# Patient Record
Sex: FEMALE | Race: WHITE | Employment: OTHER | URBAN - NONMETROPOLITAN AREA
[De-identification: names, ages, dates, MRNs, and addresses within clinical notes are randomized per-mention and may not be internally consistent; named-entity substitution may affect disease eponyms.]

---

## 2023-01-20 ENCOUNTER — APPOINTMENT (OUTPATIENT)
Dept: GENERAL RADIOLOGY | Age: 73
End: 2023-01-20
Payer: MEDICARE

## 2023-01-20 ENCOUNTER — APPOINTMENT (OUTPATIENT)
Dept: CT IMAGING | Age: 73
End: 2023-01-20
Payer: MEDICARE

## 2023-01-20 ENCOUNTER — HOSPITAL ENCOUNTER (EMERGENCY)
Age: 73
Discharge: HOME | End: 2023-01-21
Attending: STUDENT IN AN ORGANIZED HEALTH CARE EDUCATION/TRAINING PROGRAM
Payer: MEDICARE
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
## 2023-01-20 DIAGNOSIS — S09.90XA INJURY OF HEAD, INITIAL ENCOUNTER: Primary | ICD-10-CM

## 2023-01-20 LAB
ABSOLUTE EOS #: 0.2 K/UL (ref 0–0.44)
ABSOLUTE IMMATURE GRANULOCYTE: <0.03 K/UL (ref 0–0.3)
ABSOLUTE LYMPH #: 2.29 K/UL (ref 1.1–3.7)
ABSOLUTE MONO #: 0.51 K/UL (ref 0.1–1.2)
ALBUMIN SERPL-MCNC: 4.2 G/DL (ref 3.5–5.2)
ALBUMIN/GLOBULIN RATIO: 1.4 (ref 1–2.5)
ALP BLD-CCNC: 59 U/L (ref 35–104)
ALT SERPL-CCNC: 11 U/L (ref 5–33)
ANION GAP SERPL CALCULATED.3IONS-SCNC: 11 MMOL/L (ref 9–17)
AST SERPL-CCNC: 11 U/L
BASOPHILS # BLD: 0 % (ref 0–2)
BASOPHILS ABSOLUTE: <0.03 K/UL (ref 0–0.2)
BILIRUB SERPL-MCNC: 0.2 MG/DL (ref 0.3–1.2)
BUN BLDV-MCNC: 24 MG/DL (ref 8–23)
BUN/CREAT BLD: 16 (ref 9–20)
CALCIUM SERPL-MCNC: 9.8 MG/DL (ref 8.6–10.4)
CHLORIDE BLD-SCNC: 101 MMOL/L (ref 98–107)
CO2: 23 MMOL/L (ref 20–31)
CREAT SERPL-MCNC: 1.49 MG/DL (ref 0.5–0.9)
D-DIMER QUANTITATIVE: <0.27 MG/L FEU (ref 0–0.59)
EOSINOPHILS RELATIVE PERCENT: 3 % (ref 1–4)
GFR SERPL CREATININE-BSD FRML MDRD: 38 ML/MIN/1.73M2
GLUCOSE BLD-MCNC: 166 MG/DL (ref 70–99)
HCT VFR BLD CALC: 37.6 % (ref 36.3–47.1)
HEMOGLOBIN: 12.5 G/DL (ref 11.9–15.1)
IMMATURE GRANULOCYTES: 0 %
INR BLD: 1.1
LYMPHOCYTES # BLD: 34 % (ref 24–43)
MCH RBC QN AUTO: 30.7 PG (ref 25.2–33.5)
MCHC RBC AUTO-ENTMCNC: 33.2 G/DL (ref 28.4–34.8)
MCV RBC AUTO: 92.4 FL (ref 82.6–102.9)
MONOCYTES # BLD: 8 % (ref 3–12)
NRBC AUTOMATED: 0 PER 100 WBC
PDW BLD-RTO: 13.1 % (ref 11.8–14.4)
PLATELET # BLD: 229 K/UL (ref 138–453)
PMV BLD AUTO: 8.7 FL (ref 8.1–13.5)
POTASSIUM SERPL-SCNC: 3.8 MMOL/L (ref 3.7–5.3)
PROTHROMBIN TIME: 13.8 SEC (ref 11.5–14.2)
RBC # BLD: 4.07 M/UL (ref 3.95–5.11)
SEG NEUTROPHILS: 55 % (ref 36–65)
SEGMENTED NEUTROPHILS ABSOLUTE COUNT: 3.65 K/UL (ref 1.5–8.1)
SODIUM BLD-SCNC: 135 MMOL/L (ref 135–144)
TOTAL PROTEIN: 7.2 G/DL (ref 6.4–8.3)
WBC # BLD: 6.7 K/UL (ref 3.5–11.3)

## 2023-01-20 PROCEDURE — 80053 COMPREHEN METABOLIC PANEL: CPT

## 2023-01-20 PROCEDURE — 85025 COMPLETE CBC W/AUTO DIFF WBC: CPT

## 2023-01-20 PROCEDURE — 96374 THER/PROPH/DIAG INJ IV PUSH: CPT

## 2023-01-20 PROCEDURE — 36415 COLL VENOUS BLD VENIPUNCTURE: CPT

## 2023-01-20 PROCEDURE — 72170 X-RAY EXAM OF PELVIS: CPT

## 2023-01-20 PROCEDURE — 85610 PROTHROMBIN TIME: CPT

## 2023-01-20 PROCEDURE — 70450 CT HEAD/BRAIN W/O DYE: CPT

## 2023-01-20 PROCEDURE — 85379 FIBRIN DEGRADATION QUANT: CPT

## 2023-01-20 PROCEDURE — 96361 HYDRATE IV INFUSION ADD-ON: CPT

## 2023-01-20 PROCEDURE — 72125 CT NECK SPINE W/O DYE: CPT

## 2023-01-20 PROCEDURE — 71045 X-RAY EXAM CHEST 1 VIEW: CPT

## 2023-01-20 PROCEDURE — 99284 EMERGENCY DEPT VISIT MOD MDM: CPT

## 2023-01-20 ASSESSMENT — LIFESTYLE VARIABLES
HOW OFTEN DO YOU HAVE A DRINK CONTAINING ALCOHOL: NEVER
HOW MANY STANDARD DRINKS CONTAINING ALCOHOL DO YOU HAVE ON A TYPICAL DAY: PATIENT DOES NOT DRINK

## 2023-01-21 ENCOUNTER — APPOINTMENT (OUTPATIENT)
Dept: GENERAL RADIOLOGY | Age: 73
End: 2023-01-21
Payer: MEDICARE

## 2023-01-21 VITALS
DIASTOLIC BLOOD PRESSURE: 56 MMHG | OXYGEN SATURATION: 96 % | TEMPERATURE: 98.2 F | RESPIRATION RATE: 21 BRPM | BODY MASS INDEX: 34.99 KG/M2 | HEIGHT: 65 IN | WEIGHT: 210 LBS | SYSTOLIC BLOOD PRESSURE: 145 MMHG | HEART RATE: 77 BPM

## 2023-01-21 LAB
BACTERIA: ABNORMAL
BILIRUBIN URINE: NEGATIVE
COLOR: YELLOW
EPITHELIAL CELLS UA: ABNORMAL /HPF (ref 0–25)
GLUCOSE URINE: NEGATIVE
KETONES, URINE: NEGATIVE
LEUKOCYTE ESTERASE, URINE: ABNORMAL
NITRITE, URINE: NEGATIVE
PH UA: 6 (ref 5–9)
PROTEIN UA: NEGATIVE
RBC UA: ABNORMAL /HPF (ref 0–2)
SPECIFIC GRAVITY UA: >1.03 (ref 1.01–1.02)
TURBIDITY: CLEAR
URINE HGB: NEGATIVE
UROBILINOGEN, URINE: NORMAL
WBC UA: ABNORMAL /HPF (ref 0–5)

## 2023-01-21 PROCEDURE — 73110 X-RAY EXAM OF WRIST: CPT

## 2023-01-21 PROCEDURE — 6360000002 HC RX W HCPCS: Performed by: STUDENT IN AN ORGANIZED HEALTH CARE EDUCATION/TRAINING PROGRAM

## 2023-01-21 PROCEDURE — 73130 X-RAY EXAM OF HAND: CPT

## 2023-01-21 PROCEDURE — 73590 X-RAY EXAM OF LOWER LEG: CPT

## 2023-01-21 PROCEDURE — 73562 X-RAY EXAM OF KNEE 3: CPT

## 2023-01-21 PROCEDURE — 2580000003 HC RX 258: Performed by: STUDENT IN AN ORGANIZED HEALTH CARE EDUCATION/TRAINING PROGRAM

## 2023-01-21 PROCEDURE — 81001 URINALYSIS AUTO W/SCOPE: CPT

## 2023-01-21 RX ORDER — SPIRONOLACTONE 25 MG/1
25 TABLET ORAL DAILY
COMMUNITY

## 2023-01-21 RX ORDER — BUPROPION HYDROCHLORIDE 300 MG/1
300 TABLET ORAL EVERY MORNING
COMMUNITY

## 2023-01-21 RX ORDER — SERTRALINE HYDROCHLORIDE 100 MG/1
100 TABLET, FILM COATED ORAL DAILY
COMMUNITY

## 2023-01-21 RX ORDER — 0.9 % SODIUM CHLORIDE 0.9 %
1000 INTRAVENOUS SOLUTION INTRAVENOUS ONCE
Status: COMPLETED | OUTPATIENT
Start: 2023-01-21 | End: 2023-01-21

## 2023-01-21 RX ORDER — CYCLOSPORINE 0.5 MG/ML
1 EMULSION OPHTHALMIC DAILY PRN
COMMUNITY

## 2023-01-21 RX ORDER — KETOROLAC TROMETHAMINE 15 MG/ML
15 INJECTION, SOLUTION INTRAMUSCULAR; INTRAVENOUS ONCE
Status: COMPLETED | OUTPATIENT
Start: 2023-01-21 | End: 2023-01-21

## 2023-01-21 RX ORDER — LOSARTAN POTASSIUM 100 MG/1
100 TABLET ORAL DAILY
COMMUNITY

## 2023-01-21 RX ORDER — ACETAMINOPHEN 160 MG
1 TABLET,DISINTEGRATING ORAL DAILY
COMMUNITY

## 2023-01-21 RX ORDER — ATORVASTATIN CALCIUM 20 MG/1
20 TABLET, FILM COATED ORAL DAILY
COMMUNITY

## 2023-01-21 RX ADMIN — SODIUM CHLORIDE 1000 ML: 9 INJECTION, SOLUTION INTRAVENOUS at 03:44

## 2023-01-21 RX ADMIN — KETOROLAC TROMETHAMINE 15 MG: 15 INJECTION, SOLUTION INTRAMUSCULAR; INTRAVENOUS at 03:45

## 2023-01-21 ASSESSMENT — ENCOUNTER SYMPTOMS
RHINORRHEA: 0
EYE PAIN: 0
SHORTNESS OF BREATH: 0
ABDOMINAL PAIN: 0
VOMITING: 0
NAUSEA: 0

## 2023-01-21 NOTE — ED PROVIDER NOTES
[address] [facility]      Pt Name: Salma Arrington  MRN: [id number]  [place] 1950  Date of evaluation: 1/20/2023  Provider: Blanca Newman MD     CHIEF COMPLAINT       Chief Complaint   Patient presents with    [address] and was eating at dining table and stood up and tripped over cane and fell and hit head and face on tile floor. Patient never lost consciousness. Patient is on blood thinners. HISTORY OF PRESENT ILLNESS   (Location/Symptom, Timing/Onset, Context/Setting, Quality, Duration, Modifying Factors, Severity) Note limiting factors. I wore a surgical mask for the entirety of this encounter. HPI    Salma Arrington is a 81 y.o. female with medical history significant for depression, high diabetes, and hypertension who presents to the emergency department for evaluation for a fall. Patient was at a hotel when she tripped to have a cane and fell. She states she fell onto the floor face first.  She states she never lost consciousness during the incident that she endorses hitting her head. Patient states she is on blood thinners at home. She also endorses pain in her hands bilaterally. She denies neck pain or back pain. She also denies chest pain, shortness of breath or hip pain. She endorses pain in her knees with ambulation but she states she is able to ambulate. Patient denies nausea or vomiting. She still denies any focal neurologic deficits. Patient states has been following falling quite frequently at home. States this secondary to unsteady gait at home. Nursing Notes were reviewed. REVIEW OF SYSTEMS    (2+ for level 4; 10+ for level 5)   Review of Systems   Constitutional:  Negative for activity change, diaphoresis, fever and unexpected weight change. HENT:  Negative for congestion and rhinorrhea. Eyes:  Negative for pain and visual disturbance. Respiratory:  Negative for shortness of breath.     Cardiovascular:  Negative for chest pain and palpitations. Gastrointestinal:  Negative for abdominal pain, nausea and vomiting. Genitourinary:  Negative for decreased urine volume and hematuria. Musculoskeletal:  Negative for arthralgias and myalgias. Skin:  Negative for wound. Neurological:  Negative for weakness and light-headedness. Psychiatric/Behavioral:  Negative for confusion.       PAST MEDICAL HISTORY     Past Medical History:   Diagnosis Date    Depression     Diabetes mellitus ([facility] [address]     Hypertension        SURGICAL HISTORY       Past Surgical History:   Procedure Laterality Date    CARDIOVERSION      CHOLECYSTECTOMY      EYE SURGERY      HYSTERECTOMY (CERVIX STATUS UNKNOWN)         CURRENT MEDICATIONS       Previous Medications    ASPIRIN 81 MG CAPS    Take 1 tablet by mouth daily    ATORVASTATIN (LIPITOR) 20 MG TABLET    Take 20 mg by mouth daily    BUPROPION (WELLBUTRIN XL) 300 MG EXTENDED RELEASE TABLET    Take 300 mg by mouth every morning    CHOLECALCIFEROL (VITAMIN D3) 50 MCG (2000 UT) CAPS    Take 1 capsule by mouth daily    CYCLOSPORINE (RESTASIS) 0.05 % OPHTHALMIC EMULSION    Place 1 drop into both eyes daily as needed    LOSARTAN (COZAAR) 100 MG TABLET    Take 100 mg by mouth daily    METFORMIN (GLUCOPHAGE) 500 MG TABLET    Take 500 mg by mouth 2 times daily (with meals) At 2am &2pm    MULTIPLE VITAMIN (MULTIVITAMIN ADULT PO)    Take 1 tablet by mouth daily    MULTIPLE VITAMINS-MINERALS (OCUVITE ADULT 50+ PO)    Take 1 tablet by mouth daily    SERTRALINE (ZOLOFT) 100 MG TABLET    Take 100 mg by mouth daily    SPIRONOLACTONE (ALDACTONE) 25 MG TABLET    Take 25 mg by mouth daily       ALLERGIES     Adhesive tape and Pcn [penicillins]    FAMILY HISTORY       Family History   Problem Relation Age of Onset    Depression Mother     Heart Disease Father         SOCIAL HISTORY       Social History     Socioeconomic History    Marital status:      Spouse name: None    Number of children: None    Years of education: None Highest education level: None   Tobacco Use    Smoking status: Never    Smokeless tobacco: Never   Vaping Use    Vaping Use: Never used   Substance and Sexual Activity    Alcohol use: Not Currently    Drug use: Not Currently    Sexual activity: Not Currently       SCREENINGS    Vicky Coma Scale  Eye Opening: Spontaneous  Best Verbal Response: Oriented  Best Motor Response: Obeys commands  [place] Coma Scale Score: 15      PHYSICAL EXAM    (up to 7 for level 4, 8 or more for level 5)     ED Triage Vitals [01/20/23 2255]   BP Temp Temp Source Heart Rate Resp SpO2 Height Weight   (!) 147/62 98.2 °F (36.8 °C) Oral 81 18 100 % 5' 5\" (1.651 m) 210 lb (95.3 kg)       Physical Exam  Vitals and nursing note reviewed. Constitutional:       General: She is not in acute distress. Appearance: She is not ill-appearing or toxic-appearing. HENT:      Head: Normocephalic. Comments: Patient with a cephalohematoma in the front of her head with an abrasion on her nose and her upper lip. No loose teeth appreciated. No tenderness over the jaw. Midface stable. Right Ear: Tympanic membrane and external ear normal.      Left Ear: Tympanic membrane and external ear normal.      Nose: No congestion or rhinorrhea. Mouth/Throat:      Mouth: Mucous membranes are moist.      Pharynx: No oropharyngeal exudate. Eyes:      General: No scleral icterus. Right eye: No discharge. Left eye: No discharge. Conjunctiva/sclera: Conjunctivae normal.   Cardiovascular:      Rate and Rhythm: Normal rate and regular rhythm. Pulses: Normal pulses. Heart sounds: No murmur heard. No gallop. Pulmonary:      Effort: Pulmonary effort is normal. No respiratory distress. Breath sounds: Normal breath sounds. No stridor. No wheezing or rhonchi. Abdominal:      General: Abdomen is flat. There is no distension. Palpations: Abdomen is soft. Tenderness: There is no abdominal tenderness. Musculoskeletal:         General: No swelling, tenderness or deformity. Cervical back: Normal range of motion and neck supple. Skin:     General: Skin is warm. Capillary Refill: Capillary refill takes less than 2 seconds. Coloration: Skin is not jaundiced. Findings: No bruising or rash. Neurological:      General: No focal deficit present. Mental Status: She is alert and oriented to person, place, and time. Cranial Nerves: No cranial nerve deficit. Sensory: No sensory deficit. Motor: No weakness. Psychiatric:         Mood and Affect: Mood normal.         Behavior: Behavior normal.         Thought Content: Thought content normal.       DIAGNOSTIC RESULTS     Interpretation per the Radiologist below, if available at the time of this note:  XR PELVIS (1-2 VIEWS)    Result Date: 1/21/2023  EXAMINATION: ONE XRAY VIEW OF THE PELVIS 1/20/2023 11:43 pm COMPARISON: None. HISTORY: ORDERING SYSTEM PROVIDED HISTORY: fall TECHNOLOGIST PROVIDED HISTORY: fall FINDINGS: Pelvic ring intact. Fixation and laminectomy lumbar spine. Hips are in anatomic alignment. Soft tissues unremarkable. No fracture noted     XR WRIST LEFT (MIN 3 VIEWS)    Result Date: 1/21/2023  EXAMINATION: 3  XRAY VIEWS OF THE LEFT WRIST 1/21/2023 2:13 am COMPARISON: None. HISTORY: ORDERING SYSTEM PROVIDED HISTORY: [address] TECHNOLOGIST PROVIDED HISTORY: [address] FINDINGS: Degenerative changes 1st CMC joint. Distal radius and ulna are intact. No acute fracture or dislocation. Soft tissues are unremarkable. No acute fracture or dislocation of the left wrist.     XR WRIST RIGHT (MIN 3 VIEWS)    Result Date: 1/21/2023  EXAMINATION: 3 XRAY VIEWS OF THE RIGHT WRIST 1/21/2023 2:13 am COMPARISON: None. HISTORY: ORDERING SYSTEM PROVIDED HISTORY: [address] TECHNOLOGIST PROVIDED HISTORY: [address] FINDINGS: No acute fracture or dislocation. Soft tissues are unremarkable.      No acute findings in the right wrist.     XR HAND LEFT (MIN 3 VIEWS)    Result Date: 1/21/2023  EXAMINATION: THREE XRAY VIEWS OF THE LEFT HAND 1/21/2023 2:12 am COMPARISON: None. HISTORY: ORDERING SYSTEM PROVIDED HISTORY: [address] TECHNOLOGIST PROVIDED HISTORY: [address] FINDINGS: No acute fracture or dislocation. Moderate degenerative changes of the DIP joints throughout the hand, as well as 1st CMC joint. Soft tissues are unremarkable. No acute findings in the left hand. XR HAND RIGHT (MIN 3 VIEWS)    Result Date: 1/21/2023  EXAMINATION: THREE XRAY VIEWS OF THE RIGHT HAND 1/21/2023 2:13 am COMPARISON: None. HISTORY: ORDERING SYSTEM PROVIDED HISTORY: [address] TECHNOLOGIST PROVIDED HISTORY: [address] FINDINGS: No acute fracture or dislocation. Mild to moderate degenerative changes 1st CMC joint, PIP and DIP joints. Soft tissues are unremarkable. No acute findings in the right hand. XR KNEE LEFT (3 VIEWS)    Result Date: 1/21/2023  EXAMINATION: THREE XRAY VIEWS OF THE LEFT KNEE 1/21/2023 2:14 am COMPARISON: None. HISTORY: ORDERING SYSTEM PROVIDED HISTORY: fall with knee pain TECHNOLOGIST PROVIDED HISTORY: fall with knee pain FINDINGS: No acute osseous abnormality is seen. Moderate degenerative changes of the medial femoral tibial compartment, patellofemoral compartment. Mild degenerative changes lateral femorotibial compartment. There is no significant joint effusion. The soft tissues appear unremarkable. No acute abnormality identified of the left knee. XR KNEE RIGHT (3 VIEWS)    Result Date: 1/21/2023  EXAMINATION: THREE XRAY VIEWS OF THE RIGHT KNEE 1/21/2023 2:14 am COMPARISON: None. HISTORY: ORDERING SYSTEM PROVIDED HISTORY: fall with knee pain TECHNOLOGIST PROVIDED HISTORY: fall with knee pain FINDINGS: No acute fracture or dislocation. Moderate to severe degenerative changes including joint space narrowing of the medial femorotibial compartment and patellofemoral compartment. There is no significant joint effusion.   The soft tissues appear unremarkable. No acute abnormality identified of the right knee. XR TIBIA FIBULA LEFT (2 VIEWS)    Result Date: 1/21/2023  EXAMINATION: 2  XRAY VIEWS OF THE LEFT TIBIA AND FIBULA 1/21/2023 2:15 am COMPARISON: None. HISTORY: ORDERING SYSTEM PROVIDED HISTORY: fall TECHNOLOGIST PROVIDED HISTORY: fall FINDINGS: No acute fracture or dislocation. Soft tissues are unremarkable. No acute fracture or dislocation of the left tib fib. XR TIBIA FIBULA RIGHT (2 VIEWS)    Result Date: 1/21/2023  EXAMINATION: 2 XRAY VIEWS OF THE RIGHT TIBIA AND FIBULA 1/21/2023 2:14 am COMPARISON: None. HISTORY: ORDERING SYSTEM PROVIDED HISTORY: fall TECHNOLOGIST PROVIDED HISTORY: fall FINDINGS: No acute fracture or dislocation. Soft tissues are unremarkable. No acute fracture or dislocation of the right tib fib. CT Head W/O Contrast    Result Date: 1/21/2023  EXAMINATION: CT OF THE HEAD WITHOUT CONTRAST  1/20/2023 11:43 pm TECHNIQUE: CT of the head was performed without the administration of intravenous contrast. Automated exposure control, iterative reconstruction, and/or weight based adjustment of the mA/kV was utilized to reduce the radiation dose to as low as reasonably achievable. COMPARISON: None. HISTORY: ORDERING SYSTEM PROVIDED HISTORY: fall with head trauma on anticoagulation TECHNOLOGIST PROVIDED HISTORY: fall with head trauma on anticoagulation Decision Support Exception - unselect if not a suspected or confirmed emergency medical condition->Emergency Medical Condition (MA) FINDINGS: BRAIN/VENTRICLES: There is no acute intracranial hemorrhage, mass effect or midline shift. No abnormal extra-axial fluid collection. The gray-white differentiation is maintained without evidence of an acute infarct. There is prominence of the ventricles and sulci due to global parenchymal volume loss.  There are nonspecific areas of hypoattenuation within the periventricular and subcortical white matter, which likely represent chronic microvascular ischemic change. ORBITS: The visualized portion of the orbits demonstrate no acute abnormality. SINUSES: The visualized paranasal sinuses and mastoid air cells demonstrate no acute abnormality. SOFT TISSUES/SKULL: Hematoma left forehead. No acute intracranial abnormality. Hematoma left forehead. CT CSpine W/O Contrast    Result Date: 1/21/2023  EXAMINATION: CT OF THE CERVICAL SPINE WITHOUT CONTRAST 1/20/2023 11:45 pm TECHNIQUE: CT of the cervical spine was performed without the administration of intravenous contrast. Multiplanar reformatted images are provided for review. Automated exposure control, iterative reconstruction, and/or weight based adjustment of the mA/kV was utilized to reduce the radiation dose to as low as reasonably achievable. COMPARISON: None. HISTORY: ORDERING SYSTEM PROVIDED HISTORY: fall with head trauma TECHNOLOGIST PROVIDED HISTORY: fall with head trauma Decision Support Exception - unselect if not a suspected or confirmed emergency medical condition->Emergency Medical Condition (MA) FINDINGS: BONES/ALIGNMENT: There is no acute fracture or traumatic malalignment. 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 Scattered lucencies in the cervical spine are favored to be due to low bone density or degenerative changes, for instance C5 (601:45). DEGENERATIVE CHANGES: Multilevel degenerative changes resulting in mild-to-moderate spinal canal narrowing at C5-C6. SOFT TISSUES: There is no prevertebral soft tissue swelling. No acute abnormality of the cervical spine. Multilevel degenerative changes resulting in mild-to-moderate spinal canal narrowing at C5-C6. Scattered lucencies in the cervical spine are favored to be due to low bone density or degenerative changes. A non urgent outpatient cervical spine MRI with contrast can be considered for further characterization.      XR CHEST PORTABLE    Result Date: 1/21/2023  EXAMINATION: ONE XRAY VIEW OF THE CHEST 1/20/2023 11:43 pm COMPARISON: None. HISTORY: ORDERING SYSTEM PROVIDED HISTORY: fall FINDINGS: Heart size is normal. No focal consolidation in the lungs. No pleural effusion or pneumothorax. No acute abnormality. ED BEDSIDE ULTRASOUND:   Performed by ED Physician - none    LABS:  Labs Reviewed   COMPREHENSIVE METABOLIC PANEL - Abnormal; Notable for the following components:       Result Value    Glucose 166 (*)     BUN 24 (*)     Creatinine 1.49 (*)     Est, Glom Filt Rate 38 (*)     Total Bilirubin 0.2 (*)     All other components within normal limits   URINALYSIS - Abnormal; Notable for the following components:    Specific Gravity, UA >1.030 (*)     Leukocyte Esterase, Urine MODERATE (*)     All other components within normal limits   MICROSCOPIC URINALYSIS - Abnormal; Notable for the following components:    Bacteria, UA TRACE (*)     All other components within normal limits   D-DIMER, QUANTITATIVE   PROTIME-INR   CBC WITH AUTO DIFFERENTIAL        All other labs were within normal range or not returned as of this dictation. EMERGENCY DEPARTMENT COURSE and DIFFERENTIAL DIAGNOSIS/MDM:   Vitals:    Vitals:    01/21/23 0247 01/21/23 0354 01/21/23 0355 01/21/23 0356   BP:  (!) 139/51     Pulse: 80 81 81 78   Resp: 20 18 23 16   Temp:       TempSrc:       SpO2:  95% 95% 95%   Weight:       Height:           Medications   0.9 % sodium chloride bolus (1,000 mLs IntraVENous New Bag 1/21/23 0344)   ketorolac (TORADOL) injection 15 mg (15 mg IntraVENous Given 1/21/23 0345)       MDM      Patient presenting for evaluation for mechanical fall. Patient with no loss of consciousness. Presentation is concerning for traumatic injuries, intracranial bleeding given patient on anticoagulation. Review of patient's medical records indicate patient is on aspirin at home. No other anticoagulants found.     Work-up in the emergency department with a CT head, CT cervical spine, chest x-ray, and pelvic x-ray report all of which were unremarkable for any acute findings. Additional imaging including bilateral wrists, bilateral hands, bilateral knees, and bilateral tib-fib with no acute findings. Patient with no leukocytosis, no anemia, and mild KULDEEP. Patient with no transaminitis. D-dimer within normal limits. INR at 1.1. Urinalysis with a few bacteria with leukocyte esterase's. Given patient with no urinary symptoms we will hold off on starting antibiotics at this time. Urine sent for culture. Discussed lab and imaging results with the patient. Discussed with the patient plan for discharge home with outpatient PCP follow-up. Discussed with the patient at this time I do not feel she has an indication for admission. Patient does not want to be admitted. We had shared decision making. Patient verbalized understanding of information given and agreed to plan. Patient was successfully ambulated in the emergency department and discharged in stable condition. Patient did receive a liter of IV fluids given KULDEEP as well as Toradol for pain. CT of the chest and abdomen pelvis were not obtained as patient had no concerns and no tenderness palpation of either the chest or abdomen. Of note patient is a full code. REVAL:   Patient remained hemodynamically stable in the emergency department with vital signs within normal limits. CRITICAL CARE TIME   Total Critical Care time was 25 minutes, excluding separately reportable procedures. There was a high probability of clinically significant/life threatening deterioration in the patient's condition which required my urgent intervention. CONSULTS:  None    PROCEDURES:  Unless otherwise noted below, none     Procedures    FINAL IMPRESSION      1. Injury of head, initial encounter    2.  Fall, initial encounter          DISPOSITION/PLAN   DISPOSITION Decision To Discharge 01/21/2023 12:47:19 AM      PATIENT REFERRED TO:  Neisha Ellison[address] [address]  [phone]    Schedule an appointment as soon as possible for a visit in 2 days  For follow-up    DISCHARGE MEDICATIONS:  New Prescriptions    No medications on file          (Please note:  Portions of this note were completed with a voice recognition program.  Efforts were made to edit the dictations but occasionally words and phrases are mis-transcribed.)  Form v2016. J.[id number]    Jose Moctezuma MD (electronically signed)  Emergency Medicine Provider      Jose Moctezuma MD  01/21/23 1886

## 2023-01-21 NOTE — DISCHARGE INSTRUCTIONS
Thank you for trusting us  with your care today. You may use tylenol or ibuprofen as needed for pain. Please make an appointment with your primary care doctor for reevalaution in 1-4 days. Please return to the emergency department for any new concerning or worsening symptoms.